# Patient Record
Sex: FEMALE
[De-identification: names, ages, dates, MRNs, and addresses within clinical notes are randomized per-mention and may not be internally consistent; named-entity substitution may affect disease eponyms.]

---

## 2021-09-07 ENCOUNTER — NURSE TRIAGE (OUTPATIENT)
Dept: OTHER | Facility: CLINIC | Age: 36
End: 2021-09-07

## 2021-09-07 NOTE — TELEPHONE ENCOUNTER
Reason for Disposition   Sinus congestion as part of a cold, present < 10 days    Answer Assessment - Initial Assessment Questions  1. LOCATION: \"Where does it hurt? \"       No pain just congestion    2. ONSET: \"When did the sinus pain start? \"  (e.g., hours, days)       Congestion for 1 week    3. SEVERITY: \"How bad is the pain? \"   (Scale 1-10; mild, moderate or severe)    - MILD (1-3): doesn't interfere with normal activities     - MODERATE (4-7): interferes with normal activities (e.g., work or school) or awakens from sleep    - SEVERE (8-10): excruciating pain and patient unable to do any normal activities         No pain    4. RECURRENT SYMPTOM: \"Have you ever had sinus problems before? \" If so, ask: \"When was the last time? \" and \"What happened that time? \"       Has had    5. NASAL CONGESTION: \"Is the nose blocked? \" If so, ask, \"Can you open it or must you breathe through the mouth? \"      Mild    6. NASAL DISCHARGE: \"Do you have discharge from your nose? \" If so ask, \"What color? \"      Some yellow    7. FEVER: \"Do you have a fever? \" If so, ask: \"What is it, how was it measured, and when did it start? \"      Denies    8. OTHER SYMPTOMS: \"Do you have any other symptoms? \" (e.g., sore throat, cough, earache, difficulty breathing)      Mild cough and sore throat    9. PREGNANCY: \"Is there any chance you are pregnant? \" \"When was your last menstrual period? \"      NA    Protocols used: SINUS PAIN OR CONGESTION-ADULT-[place]    Brief description of triage: Nasal congestion for 7 days. Negative for Covid    Triage indicates for patient homecare    Care advice provided, patient verbalizes understanding; denies any other questions or concerns; instructed to call back for any new or worsening symptoms. This triage is a result of a call to [address] [place] [address]. Please do not respond to the triage nurse through this encounter. Any subsequent communication should be directly with the patient.

## 2021-11-26 ENCOUNTER — NURSE TRIAGE (OUTPATIENT)
Dept: OTHER | Facility: CLINIC | Age: 36
End: 2021-11-26

## 2021-11-26 NOTE — TELEPHONE ENCOUNTER
Reason for Disposition   COVID-19 vaccine, injection site reaction (e.g., pain, redness, swelling), question about    Answer Assessment - Initial Assessment Questions  1. MAIN CONCERN OR SYMPTOM:  \"What is your main concern right now? \" \"What question do you have? \" \"What's the main symptom you're worried about? \" (e.g., fever, pain, redness, swelling)      Swelling/heat in L arm after pfizer booster  2. VACCINE: \"What vaccination did you receive? \" \"Is this your first or second shot? \" (e.g., none; AstraZeneca, J&J, [address], other)      [place]    3. SYMPTOM ONSET: \"When did the swelling begin? \" (e.g., not relevant; hours, days)       About 24 hours    4. SYMPTOM SEVERITY: \"How bad is it? \"       About silver dollar sized swelling w/ heat (larger than yesterday, but less painful)    5. FEVER: \"Is there a fever? \" If Yes, ask: \"What is it, how was it measured, and when did it start? \"       Denies    6. PAST REACTIONS: \"Have you reacted to immunizations before? \" If Yes, ask: \"What happened? \"      Denies    7. OTHER SYMPTOMS: \"Do you have any other symptoms? \"      denies    Protocols used: COVID-19 - VACCINE QUESTIONS AND REACTIONS-ADULT-    Brief description of triage: Pt called with concern of swelling, heat over area of arm where OpenROV booster given 48 hours ago. Triage indicates for patient to Home Care. Care advice provided, patient verbalizes understanding; denies any other questions or concerns; instructed to call back for any new or worsening symptoms. Unable to route encounter. This triage is a result of a call to [address] [place] [address]. Please do not respond to the triage nurse through this encounter. Any subsequent communication should be directly with the patient.

## 2023-05-28 ENCOUNTER — NURSE TRIAGE (OUTPATIENT)
Dept: OTHER | Facility: CLINIC | Age: 38
End: 2023-05-28

## 2024-11-06 ENCOUNTER — OFFICE VISIT (OUTPATIENT)
Dept: PRIMARY CARE | Facility: CLINIC | Age: 39
End: 2024-11-06
Payer: COMMERCIAL

## 2024-11-06 VITALS
HEIGHT: 58 IN | DIASTOLIC BLOOD PRESSURE: 84 MMHG | OXYGEN SATURATION: 97 % | WEIGHT: 134 LBS | RESPIRATION RATE: 16 BRPM | HEART RATE: 76 BPM | TEMPERATURE: 97.7 F | BODY MASS INDEX: 28.13 KG/M2 | SYSTOLIC BLOOD PRESSURE: 124 MMHG
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
## 2024-11-06 RX ORDER — HYDROXYCHLOROQUINE SULFATE 200 MG/1
200 TABLET, FILM COATED ORAL
COMMUNITY
Start: 2016-09-23

## 2024-11-06 RX ORDER — PERMETHRIN 50 MG/G
CREAM TOPICAL ONCE
Qty: 60 G | Refills: 0 | Status: SHIPPED | OUTPATIENT
Start: 2024-11-06 | End: 2024-11-06

## 2024-11-06 RX ORDER — VIT C/E/ZN/COPPR/LUTEIN/ZEAXAN 250MG-90MG
1000 CAPSULE ORAL
COMMUNITY

## 2024-11-06 RX ORDER — IPRATROPIUM BROMIDE 21 UG/1
2 SPRAY, METERED NASAL 3 TIMES DAILY
COMMUNITY
Start: 2024-10-16

## 2024-11-06 RX ORDER — TOCILIZUMAB 180 MG/ML
162 INJECTION, SOLUTION SUBCUTANEOUS
COMMUNITY
Start: 2024-10-10

## 2024-11-06 NOTE — PROGRESS NOTES
Subjective   Patient ID: Marianne Felipe is a 39 y.o. female who presents for skin welts (They are itchy.).  Scattered in crops, few linear, small red, itchy ( worse at nite) bug bites evidenced by punctate surrounded by dark red small indurated area.  No travel, not aware of any exposures to fleas, bedbugs, etc.  None visible in linen or clothing      Review of Systems   Constitutional: Negative.    Musculoskeletal:  Negative for arthralgias.       Objective   Physical Exam  Constitutional:       General: She is not in acute distress.     Appearance: Normal appearance. She is not ill-appearing.   Pulmonary:      Effort: Pulmonary effort is normal.   Musculoskeletal:      Cervical back: Neck supple.   Skin:     Comments: Per HPI   Neurological:      Mental Status: She is oriented to person, place, and time.   Psychiatric:         Mood and Affect: Mood normal.         Assessment/Plan   Diagnoses and all orders for this visit:  Bug bite, initial encounter  -     permethrin (Elimite) 5 % cream; Apply topically 1 time for 1 dose. Apply to skin from hairline to toes and wash off 8-10 hours later.           RENETTA Duran-CNP 11/06/24 10:43 AM

## 2024-11-22 ASSESSMENT — ENCOUNTER SYMPTOMS
CONSTITUTIONAL NEGATIVE: 1
ARTHRALGIAS: 0
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
## 2024-11-23 NOTE — ASSESSMENT & PLAN NOTE
Hx and exam is c/w bedbug, chigger, etc., consider also but less likely scabies given patterns.  Recommend treatment as ordered,reviewed correct use of med, r/b/a.  Use of med can irritate skin some, be aware.  Reviewed typical or expected course versus symptoms to report.  Consider  to evaluate.  Let me  know if any new concerns or problems.